# Patient Record
Sex: FEMALE | Race: WHITE | NOT HISPANIC OR LATINO | Employment: FULL TIME | URBAN - METROPOLITAN AREA
[De-identification: names, ages, dates, MRNs, and addresses within clinical notes are randomized per-mention and may not be internally consistent; named-entity substitution may affect disease eponyms.]

---

## 2017-01-27 ENCOUNTER — ALLSCRIPTS OFFICE VISIT (OUTPATIENT)
Dept: OTHER | Facility: OTHER | Age: 37
End: 2017-01-27

## 2017-01-27 DIAGNOSIS — M26.623 BILATERAL TEMPOROMANDIBULAR JOINT PAIN: ICD-10-CM

## 2017-02-16 ENCOUNTER — ALLSCRIPTS OFFICE VISIT (OUTPATIENT)
Dept: OTHER | Facility: OTHER | Age: 37
End: 2017-02-16

## 2017-05-16 ENCOUNTER — APPOINTMENT (OUTPATIENT)
Dept: LAB | Facility: MEDICAL CENTER | Age: 37
End: 2017-05-16
Payer: COMMERCIAL

## 2017-05-16 ENCOUNTER — TRANSCRIBE ORDERS (OUTPATIENT)
Dept: ADMINISTRATIVE | Facility: HOSPITAL | Age: 37
End: 2017-05-16

## 2017-05-16 DIAGNOSIS — Z32.01 PREGNANCY EXAMINATION OR TEST, POSITIVE RESULT: ICD-10-CM

## 2017-05-16 DIAGNOSIS — Z32.01 PREGNANCY EXAMINATION OR TEST, POSITIVE RESULT: Primary | ICD-10-CM

## 2017-05-16 LAB — B-HCG SERPL-ACNC: ABNORMAL MIU/ML

## 2017-05-16 PROCEDURE — 36415 COLL VENOUS BLD VENIPUNCTURE: CPT

## 2017-05-16 PROCEDURE — 84702 CHORIONIC GONADOTROPIN TEST: CPT

## 2017-05-18 ENCOUNTER — ALLSCRIPTS OFFICE VISIT (OUTPATIENT)
Dept: OTHER | Facility: OTHER | Age: 37
End: 2017-05-18

## 2017-06-15 ENCOUNTER — APPOINTMENT (OUTPATIENT)
Dept: LAB | Facility: HOSPITAL | Age: 37
End: 2017-06-15
Attending: OBSTETRICS & GYNECOLOGY
Payer: COMMERCIAL

## 2017-06-15 ENCOUNTER — TRANSCRIBE ORDERS (OUTPATIENT)
Dept: LAB | Facility: HOSPITAL | Age: 37
End: 2017-06-15

## 2017-06-15 DIAGNOSIS — Z34.81 PRENATAL CARE, SUBSEQUENT PREGNANCY, FIRST TRIMESTER: ICD-10-CM

## 2017-06-15 DIAGNOSIS — O09.521 SUPERVISION OF ELDERLY MULTIGRAVIDA IN FIRST TRIMESTER: Primary | ICD-10-CM

## 2017-06-15 DIAGNOSIS — R03.0 ELEVATED BLOOD PRESSURE READING WITHOUT DIAGNOSIS OF HYPERTENSION: ICD-10-CM

## 2017-06-15 DIAGNOSIS — O09.521 SUPERVISION OF ELDERLY MULTIGRAVIDA IN FIRST TRIMESTER: ICD-10-CM

## 2017-06-15 LAB
ABO GROUP BLD: NORMAL
BACTERIA UR QL AUTO: ABNORMAL /HPF
BASOPHILS # BLD AUTO: 0.02 THOUSANDS/ΜL (ref 0–0.1)
BASOPHILS NFR BLD AUTO: 0 % (ref 0–1)
BILIRUB UR QL STRIP: NEGATIVE
BLD GP AB SCN SERPL QL: NEGATIVE
CLARITY UR: CLEAR
COLOR UR: YELLOW
CREAT UR-MCNC: <13 MG/DL
EOSINOPHIL # BLD AUTO: 0.11 THOUSAND/ΜL (ref 0–0.61)
EOSINOPHIL NFR BLD AUTO: 1 % (ref 0–6)
ERYTHROCYTE [DISTWIDTH] IN BLOOD BY AUTOMATED COUNT: 14 % (ref 11.6–15.1)
GLUCOSE 1H P 50 G GLC PO SERPL-MCNC: 104 MG/DL
GLUCOSE UR STRIP-MCNC: NEGATIVE MG/DL
HCT VFR BLD AUTO: 38.2 % (ref 34.8–46.1)
HGB BLD-MCNC: 12.5 G/DL (ref 11.5–15.4)
HGB UR QL STRIP.AUTO: ABNORMAL
KETONES UR STRIP-MCNC: NEGATIVE MG/DL
LEUKOCYTE ESTERASE UR QL STRIP: NEGATIVE
LYMPHOCYTES # BLD AUTO: 2.14 THOUSANDS/ΜL (ref 0.6–4.47)
LYMPHOCYTES NFR BLD AUTO: 25 % (ref 14–44)
MCH RBC QN AUTO: 29.2 PG (ref 26.8–34.3)
MCHC RBC AUTO-ENTMCNC: 32.7 G/DL (ref 31.4–37.4)
MCV RBC AUTO: 89 FL (ref 82–98)
MONOCYTES # BLD AUTO: 0.58 THOUSAND/ΜL (ref 0.17–1.22)
MONOCYTES NFR BLD AUTO: 7 % (ref 4–12)
NEUTROPHILS # BLD AUTO: 5.8 THOUSANDS/ΜL (ref 1.85–7.62)
NEUTS SEG NFR BLD AUTO: 67 % (ref 43–75)
NITRITE UR QL STRIP: NEGATIVE
NON-SQ EPI CELLS URNS QL MICRO: ABNORMAL /HPF
NRBC BLD AUTO-RTO: 0 /100 WBCS
PH UR STRIP.AUTO: 6.5 [PH] (ref 4.5–8)
PLATELET # BLD AUTO: 293 THOUSANDS/UL (ref 149–390)
PMV BLD AUTO: 10.3 FL (ref 8.9–12.7)
PROT UR STRIP-MCNC: NEGATIVE MG/DL
PROT UR-MCNC: <6 MG/DL
RBC # BLD AUTO: 4.28 MILLION/UL (ref 3.81–5.12)
RBC #/AREA URNS AUTO: ABNORMAL /HPF
RH BLD: POSITIVE
RUBV IGG SERPL IA-ACNC: 32.2 IU/ML
SP GR UR STRIP.AUTO: 1 (ref 1–1.03)
SPECIMEN EXPIRATION DATE: NORMAL
T4 SERPL-MCNC: 8.6 UG/DL (ref 4.7–13.3)
TSH SERPL DL<=0.05 MIU/L-ACNC: 0.32 UIU/ML (ref 0.36–3.74)
UROBILINOGEN UR QL STRIP.AUTO: 0.2 E.U./DL
WBC # BLD AUTO: 8.71 THOUSAND/UL (ref 4.31–10.16)
WBC #/AREA URNS AUTO: ABNORMAL /HPF

## 2017-06-15 PROCEDURE — 87086 URINE CULTURE/COLONY COUNT: CPT

## 2017-06-15 PROCEDURE — 84436 ASSAY OF TOTAL THYROXINE: CPT

## 2017-06-15 PROCEDURE — 82570 ASSAY OF URINE CREATININE: CPT | Performed by: OBSTETRICS & GYNECOLOGY

## 2017-06-15 PROCEDURE — 86787 VARICELLA-ZOSTER ANTIBODY: CPT

## 2017-06-15 PROCEDURE — 84156 ASSAY OF PROTEIN URINE: CPT | Performed by: OBSTETRICS & GYNECOLOGY

## 2017-06-15 PROCEDURE — 81001 URINALYSIS AUTO W/SCOPE: CPT

## 2017-06-15 PROCEDURE — 36415 COLL VENOUS BLD VENIPUNCTURE: CPT

## 2017-06-15 PROCEDURE — 84443 ASSAY THYROID STIM HORMONE: CPT

## 2017-06-15 PROCEDURE — 80081 OBSTETRIC PANEL INC HIV TSTG: CPT

## 2017-06-15 PROCEDURE — 82950 GLUCOSE TEST: CPT

## 2017-06-16 LAB
BACTERIA UR CULT: NORMAL
HBV SURFACE AG SER QL: NORMAL
HIV 1+2 AB+HIV1 P24 AG SERPL QL IA: NORMAL
RPR SER QL: NORMAL

## 2017-06-20 LAB — VZV IGG SER IA-ACNC: NORMAL

## 2018-01-09 NOTE — PROGRESS NOTES
MAR 1 2016         RE: Wero De Los Santos GYN   MR#: [id number]                                   [address]   : Maxwell Bianchier:                                              [address]   (Exam #: [id number])                          Fax: [phone]      The LMP of this 29year old,  8, para 2 patient was MAY 29 2015,   giving her an MESFIN of MAR 4 2016 and a current gestational age of 43 weeks   4 days by dates  A sonographic examination was performed on MAR 1 2016   using real time equipment  The patient has a BMI of 32 3  Her blood   pressure today was 124/84  Cardiac motion was observed at 160 bpm       INDICATIONS      oligohydramnios      Exam Types      Biophysical Profile      RESULTS      Fetus # 1 of 1   Vertex presentation   Placenta Location = Left lateral   No placenta previa   Placenta Grade = II      AMNIOTIC FLUID      HANSA Total = 12 4 cm   Amniotic Fluid: Normal      BIOPHYSICAL PROFILE      The Biophysical Profile score was 10/10  Breathin  Movement: 2  Tone: 2  AFV: 2  NST: 2      IMPRESSION      Gabriel IUP   Vertex presentation   Regular fetal heart rate of 160 bpm   Left lateral placenta   No placenta previa      GENERAL COMMENT      Suggested follow-up fetal surveillance includes continuation of twice per   week NST's, weekly HANSA's, and daily kick counts  MAGGI Parker M D     Maternal-Fetal Medicine   Electronically signed 16 12:38

## 2018-01-10 NOTE — RESULT NOTES
Message   normal biopsy result  Verified Results  (1) TISSUE EXAM [id number] 01:09PM Krishna Cure     Test Name Result Flag Reference   LAB AP CASE REPORT (Report)     Surgical Pathology Report             Case: [id number]                   Authorizing Provider: Timothy Rice MD       Collected:      06/13/2016 1309        Ordering Location:   [address] [place] [address]    Received:      06/14/2016 [address] [place] [address] Endoscopy                            Pathologist:      Harlan Pugh MD                                 Specimen:  Rectum, Cold bx, rectum r/o proctitis   LAB AP FINAL DIAGNOSIS (Report)     A  Rectum (biopsy):    - Colonic mucosa with no significant pathologic abnormalities  - No active inflammation or histologic evidence of a microscopic   (lymphocytic)     or collagenous colitis noted  - No granulomas, dysplasia or neoplasia identified  Electronically signed by Harlan Pugh MD on 6/15/2016 at 9:30 AM   LAB AP NOTE      Interpretation performed at [facility], [address] [place] [address]  LAB AP SURGICAL ADDITIONAL INFORMATION (Report)     These tests were developed and their performance characteristics   determined by [facility] or [employer]  They may not be cleared or approved by the [place]  Food and   Drug Administration  The FDA has determined that such clearance or   approval is not necessary  These tests are used for clinical purposes  They should not be regarded as investigational or for research  This   laboratory has been approved by CLIA 88, designated as a high-complexity   laboratory and is qualified to perform these tests  LAB AP GROSS DESCRIPTION (Report)     A  The specimen is received in formalin, labeled with the patient's name   and hospital number, and is designated rectum biopsy  The specimen   consists of 2 tan-pink soft tissue fragments each measuring 0 2 cm in   greatest dimension  Entirely submitted   One cassette  Note: The estimated total formalin fixation time based upon information   provided by the submitting clinician and the standard processing schedule   is 25 0 hours    MAS   LAB AP CLINICAL INFORMATION Blood in stool

## 2018-01-10 NOTE — PROGRESS NOTES
FEB 3 2016         RE: Flaco Eubanks Okkeerthi GYN   MR#: [id number]                                   [address]   : [address]                                  [address]   ENC: [id number]                             [place], [address]   (Exam #: [id number])                           Fax: [phone]      The LMP of this 29year old,  8, para 2 patient was MAY 29 2015,   giving her an MESFIN of MAR 4 2016 and a current gestational age of 27 weeks   5 days by dates  A sonographic examination was performed on FEB 3 2016   using real time equipment  The ultrasound examination was performed using   abdominal technique  The patient has a BMI of 31 4  Her blood pressure   today was 125/70  Earliest ultrasound found in her record:8/5/15  9w2d  MESFIN 3/7/16      Problem list:   1  Advanced maternal age with normal NIPT   2  History of a 29 week spontaneous  birth of twins   3  History of 3 miscarriages            Cardiac motion was observed at 138 bpm       INDICATIONS      advanced maternal age   previous  delivery   low amniotic fluid   evaluate amniotic fluid volume      Exam Types      Level I      RESULTS      Fetus # 1 of 1   Vertex presentation   Placenta Location = Posterior   No placenta previa   Placenta Grade = II      AMNIOTIC FLUID      Q1: 1 6      Q2: 2 3      Q3: 4 0      Q4: 2 6   HANSA Total = 10 4 cm   Amniotic Fluid: Normal      IMPRESSION      Gabriel IUP   Vertex presentation   Regular fetal heart rate of 138 bpm   Posterior placenta   No placenta previa      GENERAL COMMENT      The patient presented today for fetal surveillance secondary to previously   appreciated low amniotic fluid  She had a modified biophysical profile,   which includes an NST and evaluation of the amniotic fluid  The NST was   reactive and reassuring  The amniotic fluid index was 10 4cm, which is   normal for gestational age        Given that the patient's amniotic fluid has been normal now over the past   2 evaluations and her fetal growth was in the 45th percentile last time,   recommend followup as clinically indicated  Thank very much for allowing us to participate in the care of your   patient  Should you have any questions, please do not hesitate to contact   our office  MAGGI Hunter M D     Electronically signed 02/04/16 11:44

## 2018-01-10 NOTE — PROGRESS NOTES
2016         RE: Gaby Cluster                             ToLawrence Bias GYN   MR#: [id number]                                   [address]   : [address]                                  [address]   ENC: [id number] [place], [address]   (Exam #: [id number])                          Fax: [phone]      The LMP of this 29year old,  8, para 2 patient was MAY 29 2015,   giving her an MESFIN of MAR 4 2016 and a current gestational age of 42 weeks   6 days by dates  A sonographic examination was performed on 2016   using real time equipment  The ultrasound examination was performed using   abdominal technique  The patient has a BMI of 31 8  Her blood pressure   today was 117/72  Earliest ultrasound found in her record:8/5/15  9w2d  MESFIN 3/7/16      Problem list:   1  Advanced maternal age with normal NIPT   2  History of a 29 week spontaneous  birth of twins   3  History of 3 miscarriages            Cardiac motion was observed at 144 bpm       INDICATIONS      advanced maternal age   previous  delivery   low amniotic fluid   history of , spontaneous   obesity   fetal growth      Exam Types      Level I      RESULTS      Fetus # 1 of 1   Vertex presentation   Fetal growth appeared normal   Placenta Location = Left lateral   No placenta previa   Placenta Grade = II      MEASUREMENTS (* Included In Average GA)      OFD             11 3 cm   AC              31 8 cm        35 weeks 6 days* (36%)   BPD              9 0 cm        36 weeks 2 days* (48%)   HC              32 5 cm        36 weeks 2 days* (34%)   Femur            7 1 cm        36 weeks 1 day * (47%)      HC/AC           1 02   FL/AC           0 23   FL/BPD          0 80   EFW (Ac/Fl/Hc)  2845 grams - 6 lbs 4 oz                 (35%)      THE AVERAGE GESTATIONAL AGE is 36 weeks 1 day +/- 21 days        AMNIOTIC FLUID      Q1: 3 2      Q2: 3 4      Q3: 1 9 Q4: 3 4   HANSA Total = 12 0 cm   Amniotic Fluid: Normal      FETAL VESSELS                                     S/D   PI    RI    PSV   AEDV RF                                                    cm/s       Umbilical Artery           2 58  0 96  0 61      ANATOMY DETAILS      Visualized Appearing Sonographically Normal:   HEAD: (Calvarium, BPD Level, Lateral Ventricles, Choroid Plexus,   Cerebellum, Cisterna Magna);    TH  CAV : (Diaphragm);    ABD  CAV ,   STOMACH, RIGHT KIDNEY, LEFT KIDNEY, BLADDER, PLACENTA, UMBL  CORD      Not Visualized:   HEART      IMPRESSION      Gabriel IUP   36 weeks and 1 day by this ultrasound  (MESFIN=MAR 9 2016)   Vertex presentation   Fetal growth appeared normal   Regular fetal heart rate of 144 bpm   Left lateral placenta   No placenta previa      GENERAL COMMENT      On exam today the patient appears well, in no acute distress, and denies   any complaints  Her abdomen is non-tender  There has been appropriate interval fetal growth  Good fetal movement and   tone are seen  The amniotic fluid volume appears normal   The placenta is   left lateral and it appears sonographically normal   Fetal Dopplers are   normal for gestational age  The anatomic structures listed above could   not be optimally imaged today because of fetal position; however, these   structures were seen on a prior ultrasound and appeared sonographically   normal   The patient was informed of today's findings and all of her   questions were answered  The limitations of ultrasound were reviewed with   the patient, which she accepts  Precautions as well as fetal kick counts   were reviewed  Recommend further ultrasounds as clinically indicated        Please note, in addition to the time spent discussing the results of the   ultrasound, I spent approximately 10 minutes of face-to-face time with the   patient, greater than 50% of which was spent in counseling and the   coordination of care for this patient  Thank you very much for allowing us to participate in the care of this   very nice patient  Should you have any questions, please do not hesitate   to contact our office  MAGGI Rivas M D     Electronically signed 02/11/16 14:47           Electronically signed by:SHAHID Haji DO  Feb 16 2016  5:44PM EST

## 2018-01-11 NOTE — PROGRESS NOTES
2016         RE: Eldon Payton Senisela GYN   MR#: [id number]                                   [address]   : [address]                                  [address]   ENC: [id number] [place], [address]   (Exam #: [id number])                           Fax: [phone]      The LMP of this 29year old,  8, para 2 patient was MAY 29 2015,   giving her an MESFIN of MAR 4 2016 and a current gestational age of 27 weeks   0 days by dates  A sonographic examination was performed on 2016   using real time equipment  The ultrasound examination was performed using   abdominal technique  The patient has a BMI of 31 2  Her blood pressure   today was 112/66  Earliest ultrasound found in her record:8/5/15  9w2d  MESFIN 3/7/16      Problem list:   1  Advanced maternal age with normal NIPT   2  History of a 29 week spontaneous  birth of twins   3  History of 3 miscarriages            Cardiac motion was observed at 145 bpm       INDICATIONS      advanced maternal age   previous  delivery   low amniotic fluid   evaluate amniotic fluid volume      Exam Types      Amniotic Fluid Index      RESULTS      Fetus # 1 of 1   Vertex presentation   Placenta Location = Posterior, right lateral   No placenta previa   Placenta Grade = II      AMNIOTIC FLUID      Q1: 3 2      Q2: 2 1      Q3: 3 7      Q4:   HANSA Total = 9 0 cm   Amniotic Fluid: Normal      The NST was reactive with no decelerations  IMPRESSION      Gabriel IUP   Vertex presentation   Regular fetal heart rate of 145 bpm   Posterior, right lateral placenta   No placenta previa      GENERAL COMMENT      Her follow up care should include twice weekly NST's and a once weekly   amniotic fluid assessment, along with her daily kick counts  MAGGI Milan S , R D C S  ASAD Sullivan     Maternal-Fetal Medicine   Electronically signed 01/29/16 17:46

## 2018-01-13 VITALS
BODY MASS INDEX: 26.17 KG/M2 | HEART RATE: 95 BPM | SYSTOLIC BLOOD PRESSURE: 114 MMHG | DIASTOLIC BLOOD PRESSURE: 80 MMHG | TEMPERATURE: 98 F | WEIGHT: 153.31 LBS | RESPIRATION RATE: 16 BRPM | OXYGEN SATURATION: 98 % | HEIGHT: 64 IN

## 2018-01-13 VITALS
BODY MASS INDEX: 25.44 KG/M2 | DIASTOLIC BLOOD PRESSURE: 88 MMHG | WEIGHT: 149 LBS | OXYGEN SATURATION: 98 % | SYSTOLIC BLOOD PRESSURE: 124 MMHG | HEIGHT: 64 IN | TEMPERATURE: 98.5 F | HEART RATE: 110 BPM | RESPIRATION RATE: 18 BRPM

## 2018-01-14 VITALS
HEIGHT: 64 IN | SYSTOLIC BLOOD PRESSURE: 112 MMHG | HEART RATE: 99 BPM | BODY MASS INDEX: 23.56 KG/M2 | WEIGHT: 138 LBS | DIASTOLIC BLOOD PRESSURE: 64 MMHG | TEMPERATURE: 97 F | RESPIRATION RATE: 18 BRPM | OXYGEN SATURATION: 99 %

## 2018-01-16 NOTE — PROGRESS NOTES
2016         RE: Selena Mcdonnell GYN   MR#: [id number]                                   [address]   : [address]                                  [address]   ENC: [id number]                             [place], [address]   (Exam #: [id number])                           Fax: [phone]      The LMP of this 29year old,  8, para 2 patient was MAY 29 2015,   giving her an MESFIN of MAR 4 2016 and a current gestational age of 29 weeks   5 days by dates  A sonographic examination was performed on 2016   using real time equipment  The ultrasound examination was performed using   abdominal technique  The patient has a BMI of 30 9  Her blood pressure   today was 121/64  Earliest ultrasound found in her record:8/5/15  9w2d  MESFIN 3/7/16      Problem list:   1  Advanced maternal age with normal NIPT   2  History of a 29 week spontaneous  birth of twins   3  History of 3 miscarriages            Cardiac motion was observed at 149 bpm       INDICATIONS      advanced maternal age   previous  delivery   fetal growth      Exam Types      Level I      RESULTS      Fetus # 1 of 1   Vertex presentation   Fetal growth appeared normal   Placenta Location = Posterior, fundal   No placenta previa   Placenta Grade = II      MEASUREMENTS (* Included In Average GA)      OFD             10 9 cm   AC              30 3 cm        34 weeks 2 days* (59%)   BPD              8 4 cm        34 weeks 0 days* (50%)   HC              31 0 cm        34 weeks 1 day * (44%)   Femur            6 3 cm        32 weeks 4 days* (35%)      HC/AC           1 03   FL/AC           0 21   FL/BPD          0 75   EFW (Ac/Fl/Hc)  2287 grams - 5 lbs 1 oz                 (45%)      THE AVERAGE GESTATIONAL AGE is 33 weeks 6 days +/- 21 days        AMNIOTIC FLUID      HANSA Total = 7 6 cm   Amniotic Fluid: Normal      FETAL VESSELS S/D   PI    RI    PSV   AEDV RF                                                    cm/s       Umbilical Artery           3 09  1 09  0 68      ANATOMY COMMENTS      Fetal anatomy has been previously documented; no anomalies were   identified  No fetal structural abnormality is identified on the Level I   survey today  Follow up anatomy of the lateral ventricles, 4 chamber view,   outflow tracts, diaphragm,  kidneys, stomach and bladder appear normal    Fetal interval growth and amniotic fluid volume are normal  The umbilical   artery dopplers are normal for gestational age  IMPRESSION      Gabriel IUP   33 weeks and 6 days by this ultrasound  (MESFIN=MAR 3 2016)   Vertex presentation   Fetal growth appeared normal   Regular fetal heart rate of 149 bpm   Posterior, fundal placenta   No placenta previa      GENERAL COMMENT            I had the pleasure of seeing Alfredo Art  in the [facility]    today for followup growth scan  She reports normal daily fetal movements  She denies any vaginal bleeding, leakage of fluid, or significant   contractions or pelvic pressure  There has been no major pregnancy   complications since her last visit here in the  Center  On today's ultrasound, the fetus was in a vertex presentation  The   amniotic fluid was borderline low with an HANSA of 7 6  Fetal growth was   within normal range  The umbilical artery Doppler pulsatility index was   very normal today and thus today's ultrasound was very reassuring  The   interval anatomy seen today showed no obvious anomalies  Given the   borderline low fluid she did go on to have a reactive nonstress test          We discussed the importance of initiating fetal kick counting at least   once daily  We discussed the "10 kicks in 2 hour rule"  I instructed her   to report to you immediately should criteria not be met  Kick counts   should begin at 28 weeks gestation        IMPORTANT  FINDINGS ON TODAY'S ULTRASOUND: Borderline low fluid with an   HANSA of 7 6  My concern is that the amniotic fluid is in an intermediate   zone that is between 5 and 8 cm  IN SUMMARY:  Today's ultrasound was reassuring as the fetus is growing   well with borderline low amniotic fluid and a normal umbilical artery   Doppler flow study  The fetus was in a vertex presentation  She should   continue to do her kick counts on a daily basis  A fetal growth evaluation   is recommended in 3 weeks and this was scheduled today  Weekly nonstress   testing is indicated and can be done here in the  Center or in   your office, the location of which I will leave to your discretion  Face-to-face time, in addition to time spent discussing ultrasound results   was 10 minutes, with greater than 50% of the time used for counseling and   coordination of care  A description of the counseling/coordination of care   is described above  Thank you very much for allowing us to participate in the care of your   patient  If you have any questions or concerns about today's visit please   do not hesitate to call me  Thank you very much  Lori ANTONY  Maternal Fetal Medicine      MAGGI Melendez M D     Maternal-Fetal Medicine   Electronically signed 16 12:46